# Patient Record
Sex: FEMALE | Race: WHITE | NOT HISPANIC OR LATINO | Employment: OTHER | URBAN - METROPOLITAN AREA
[De-identification: names, ages, dates, MRNs, and addresses within clinical notes are randomized per-mention and may not be internally consistent; named-entity substitution may affect disease eponyms.]

---

## 2018-06-16 ENCOUNTER — HOSPITAL ENCOUNTER (EMERGENCY)
Facility: HOSPITAL | Age: 53
Discharge: HOME | End: 2018-06-17
Attending: EMERGENCY MEDICINE
Payer: MEDICAID

## 2018-06-16 DIAGNOSIS — S82.62XA CLOSED DISPLACED FRACTURE OF LATERAL MALLEOLUS OF LEFT FIBULA, INITIAL ENCOUNTER: Primary | ICD-10-CM

## 2018-06-16 DIAGNOSIS — M25.572 LEFT ANKLE PAIN: ICD-10-CM

## 2018-06-16 PROCEDURE — 99284 EMERGENCY DEPT VISIT MOD MDM: CPT | Mod: 25

## 2018-06-16 PROCEDURE — 29515 APPLICATION SHORT LEG SPLINT: CPT | Mod: LT

## 2018-06-16 RX ORDER — HYDROCODONE BITARTRATE AND ACETAMINOPHEN 5; 325 MG/1; MG/1
1 TABLET ORAL
Status: COMPLETED | OUTPATIENT
Start: 2018-06-16 | End: 2018-06-17

## 2018-06-17 VITALS
TEMPERATURE: 98 F | WEIGHT: 165 LBS | OXYGEN SATURATION: 96 % | RESPIRATION RATE: 17 BRPM | HEART RATE: 86 BPM | SYSTOLIC BLOOD PRESSURE: 108 MMHG | DIASTOLIC BLOOD PRESSURE: 68 MMHG | BODY MASS INDEX: 33.26 KG/M2 | HEIGHT: 59 IN

## 2018-06-17 PROBLEM — S82.62XA CLOSED DISPLACED FRACTURE OF LATERAL MALLEOLUS OF LEFT FIBULA: Status: ACTIVE | Noted: 2018-06-17

## 2018-06-17 PROCEDURE — 29515 APPLICATION SHORT LEG SPLINT: CPT | Mod: LT

## 2018-06-17 PROCEDURE — 25000003 PHARM REV CODE 250: Performed by: EMERGENCY MEDICINE

## 2018-06-17 RX ORDER — ZOLPIDEM TARTRATE 10 MG/1
5 TABLET ORAL NIGHTLY PRN
COMMUNITY

## 2018-06-17 RX ORDER — TRAMADOL HYDROCHLORIDE 50 MG/1
50 TABLET ORAL EVERY 6 HOURS PRN
COMMUNITY

## 2018-06-17 RX ORDER — ONDANSETRON 4 MG/1
4 TABLET, FILM COATED ORAL EVERY 8 HOURS PRN
Qty: 30 TABLET | Refills: 0 | Status: SHIPPED | OUTPATIENT
Start: 2018-06-17

## 2018-06-17 RX ORDER — OMEPRAZOLE 40 MG/1
40 CAPSULE, DELAYED RELEASE ORAL DAILY
COMMUNITY

## 2018-06-17 RX ORDER — HYDROCODONE BITARTRATE AND ACETAMINOPHEN 5; 325 MG/1; MG/1
1 TABLET ORAL EVERY 4 HOURS PRN
Qty: 30 TABLET | Refills: 0 | Status: SHIPPED | OUTPATIENT
Start: 2018-06-17

## 2018-06-17 RX ORDER — IBUPROFEN 200 MG
1 TABLET ORAL
COMMUNITY

## 2018-06-17 RX ORDER — BUSPIRONE HYDROCHLORIDE 5 MG/1
5 TABLET ORAL 2 TIMES DAILY
COMMUNITY

## 2018-06-17 RX ORDER — LEVOTHYROXINE SODIUM 50 UG/1
50 TABLET ORAL DAILY
COMMUNITY

## 2018-06-17 RX ADMIN — HYDROCODONE BITARTRATE AND ACETAMINOPHEN 1 TABLET: 5; 325 TABLET ORAL at 12:06

## 2018-06-17 NOTE — ED NOTES
"Patient states she has "brain cancer, muscle cancer, breast cancer and joint cancer." Unable to recall the type of cancer.  "

## 2018-06-17 NOTE — ED NOTES
Bed: TL 02  Expected date:   Expected time:   Means of arrival:   Comments:     Elvin Figueroa RN  06/16/18 8301

## 2018-06-17 NOTE — ED PROVIDER NOTES
"SCRIBE #1 NOTE: I, Jimmy Fleming, am scribing for, and in the presence of, Nash Jasso Jr., MD. I have scribed the entire note.      History      Chief Complaint   Patient presents with    Fall     pt states she tripped and fell and twisted L ankle; pt c/o pain and swelling to L ankle       Review of patient's allergies indicates:   Allergen Reactions    Adhesive Itching    Cortizone-10 [hydrocortisone] Other (See Comments)        HPI   HPI    6/16/2018, 11:24 PM   History obtained from the patient       History of Present Illness: Mary Kay Addison is a 52 y.o. female patient who presents to the Emergency Department for evaluation after a fall which onset suddenly 2 hours PTA. Pt reports that she slipped, fell, and "rolled" her left ankle. Pt is currently complaining of left ankle pain. Pt denies any head trauma/ injury or LOC. Symptoms are constant and moderate in severity. Sxs are worsened with movement. Pt is currently in a wheelchair. No mitigating reported. No other associated sxs reported. Patient denies any dizziness, headache, back pain, neck pain, hip pain, knee pain, chest pain, focal weakness/ numbness, and all other sxs at this time. No further complaints or concerns at this time.       Arrival mode: Personal vehicle    PCP: Primary Doctor No       Past Medical History:  Past Medical History:   Diagnosis Date    Anemia     Anxiety     Asthma     Brain cancer     diagnosed in March    Depression     MRSA infection     Osteoarthritis     Peptic ulcer        Past Surgical History:  Past Surgical History:   Procedure Laterality Date    BREAST LUMPECTOMY Left          Family History:  Hx reviewed, not pertinent    Social History:  Social History     Social History Main Topics    Smoking status: Current Every Day Smoker     Packs/day: 0.50    Smokeless tobacco: Never Used    Alcohol use No    Drug use: No    Sexual activity: Not on file       ROS   Review of Systems   Constitutional: " Negative for chills and fever.   HENT:        - head trauma/ injury   Respiratory: Negative for shortness of breath.    Cardiovascular: Negative for chest pain.   Gastrointestinal: Negative for abdominal pain, diarrhea, nausea and vomiting.   Genitourinary: Negative for flank pain, frequency and urgency.   Musculoskeletal: Negative for back pain, neck pain and neck stiffness.        + left ankle pain  - knee pain  - hip pain   Neurological: Negative for dizziness, syncope, weakness, light-headedness, numbness and headaches.   All other systems reviewed and are negative.      Physical Exam      Initial Vitals [06/16/18 2256]   BP Pulse Resp Temp SpO2   103/65 93 20 97.8 °F (36.6 °C) 95 %      MAP       --          Physical Exam  Nursing Notes and Vital Signs Reviewed.  Constitutional: Patient is in no apparent distress. Awake and alert. Appropriate for age.   Head: Atraumatic. No facial instability or step-offs.   Eyes: PERRL. EOM normal. Conjunctivae normal.   HENT: Moist mucous membranes. No epistaxis. Patent airway.   Neck: No midline bony tenderness, deformities, or step-offs   Cardiovascular: Regular rate and rhythm. Heart sounds are normal. Intact distal pulses   Pulmonary/Chest: No respiratory distress. Breath sounds are normal. No decreased breath sounds. Chest wall is stable.   Abdominal: Soft and non-distended. Non-tender.   Back: No abrasions or ecchymosis. No midline bony tenderness to the T-spine or L-spine. No deformities or step-offs.   Musculoskeletal: Full range of motion in bilateral extremities. No obvious deformities.   Left Ankle:  No obvious deformity. Tenderness to palpation over left lateral malleolus. Limited ROM secondary to pain. Ankle dorsiflexion and ankle plantar flexion are intact.  Intact sensation to light touch. Distal capillary refill takes less than 2 seconds.  PT and DP pulses are 2+ bilaterally. NV intact. Skin intact. No abrasions or lacerations appreciated.   Skin: Normal  "color. No cyanosis. No lacerations. No abrasions   Neurological: Awake and alert. Appropriate for age. GCS 15. Normal speech. Motor strength is normal at 5/5 bilaterally. Non-focal neurological examination.        ED Course    Orthopedic Injury  Date/Time: 2018 1:48 AM  Performed by: [facility]  Authorized by: [facility]     Consent Done?:  Yes  Universal Protocol:     Consent given by:  Patient    Patient identity confirmed:   and name  Injury:     Injury location:  Ankle    Location details:  Left ankle    Injury type:  Fracture    Fracture type: lateral malleolus      Fracture type: lateral malleolus        Pre-procedure assessment:     Neurovascular status: Neurovascularly intact      Distal perfusion: normal      Neurological function: normal      Range of motion: reduced      Local anesthesia used?: No      Patient sedated?: No        Selections made in this section will also lock the Injury type section above.:     Manipulation performed?: No      Immobilization:  Crutches and splint    Splint type: stirrup & posterior splint.    Supplies used:  Aluminum splint    Complications: No      Specimens: No      Implants: No    Post-procedure assessment:     Neurovascular status: Neurovascularly intact      Distal perfusion: normal      Neurological function: normal      Range of motion: improved      Patient tolerance:  Patient tolerated the procedure well with no immediate complications      ED Vital Signs:  Vitals:    18 2256 18 0256   BP: 103/65 108/68   Pulse: 93 86   Resp: 20 17   Temp: 97.8 °F (36.6 °C) 98.3 °F (36.8 °C)   TempSrc: Oral Oral   SpO2: 95% 96%   Weight: 74.8 kg (165 lb)    Height: 4' 11" (1.499 m)            Imaging Results:  Imaging Results          X-Ray Foot Complete Left (Impression:)    Mildly displaced left lateral malleolus fracture            X-Ray Ankle Complete Left (Impression:)    Mildly displaced left lateral malleolus fracture                 "     The Emergency Provider reviewed the vital signs and test results, which are outlined above.    ED Discussion     1:35 AM: Reassessed pt at this time. Instructed patient to remain non-weight bearing. Discussed with pt all pertinent ED information and results. Discussed pt dx and plan of tx. Gave pt all f/u and return to the ED instructions. All questions and concerns were addressed at this time. Pt expresses understanding of information and instructions, and is comfortable with plan to discharge. Pt is stable for discharge.    Trauma precautions were discussed with patient and/or family/caretaker; I do not specifically detect any abdominal, thoracic, CNS, orthopedic, or other emergent or life threatening condition and that patient is safe to be discharged.  It was also discussed that despite an unrevealing examination and negative radiographic examination for serious or life threatening injury, these conditions may still exist.  As such, patient should return to ED immediately should they experience, severe or worsening pain, shortness of breath, abdominal pain, headache, vomiting, or any other concern.  It was also discussed that not infrequently, injuries may not be diagnosed during the initial ED visit (such as fractures) and that if the patient discovers a new area of concern, a new area of injury that was not evaluated in the ED, they should return for evaluation as they may have an injury that requires treatment.    Regarding FRACTURE CARE, I advised patient and guardian that patient should:  expect some discomfort and take medications as prescribed for pain management; keep extremity elevated above the level of the heart to reduce swelling; apply ice bag to outside of splint to help reduce swelling; and follow up with primary care provider or orthopedic specialist as instructed.  Instructed patient and guardian to keep splint in place to hold fracture in place and prevent further injury and to keep it  clean and dry.  Patient and guardian advised to return to the emergency department for any complications (numbness to extremity, lack of circulation, damage to splint, etc).    Regarding ANKLE PAIN, for treatment, patient instructed to: rest ankle for several days without applying weight on ankle; use an ACE bandage or ankle brace; consider using crutches or a cane to help take the weight off a sore or unsteady ankle; keep foot/ankle elevated; apply ice to area immediately and for 10-15 minutes every hour for the first day, then, apply ice every 3-4 hours for 2 more days; and try over-the-counter Tylenol or Ibuprofen for pain and swelling. Patient advised to notify primary care provider or return to ED if swelling does not decrease within 2-3 days or notice signs or symptoms of infection (redness, increased pain, fever, and warmth around ankle); or if they notice a popping sound and have immediate trouble using or moving ankle. For prevention, patient advised to obtain/maintain healthy weight; warm up before exercising; avoid sports and activities in which proper conditioning has not been achieved; ensure that shoes fit properly; use ankle support braces, work on balance, and do agility exercises.    Driving or other activities under influence of medications - Patient and/or family/caretaker was given a prescription for, or instructed to use a medicine that may impair ability to drive, operate machinery, or participate in other potentially dangerous activities.  Patient was instructed not to participate in these activities while under the influence of these medications.    ED Medication(s):  Medications   HYDROcodone-acetaminophen 5-325 mg per tablet 1 tablet (1 tablet Oral Given 6/17/18 0011)       Discharge Medication List as of 6/17/2018  1:35 AM      START taking these medications    Details   HYDROcodone-acetaminophen (NORCO) 5-325 mg per tablet Take 1 tablet by mouth every 4 (four) hours as needed for Pain.,  Starting Sun 6/17/2018, Print      ondansetron (ZOFRAN) 4 MG tablet Take 1 tablet (4 mg total) by mouth every 8 (eight) hours as needed., Starting Sun 6/17/2018, Print             Follow-up Information     [facility] - Orthoped[facility]. Schedule an appointment as soon as possible for a visit in 3 days.    Specialty:  Orthopedics  Contact information:  [address] [facility] [address] [phone]  Additional information:  ([address]) 2nd floor           [facility] - Internal Medicine. Schedule an appointment as soon as possible for a visit in 1 week.    Specialty:  Internal Medicine  Contact information:  [address] [facility] [address] [phone]  Additional information:  ([address]) 1st floor           [facility]. Schedule an appointment as soon as possible for a visit in 1 week.    Contact information:  [address] [facility] [address] [phone]             [facility]    Specialty:  Emergency Medicine  Why:  As needed, If symptoms worsen  Contact information:  [address] [facility] [address] [phone]                   Medical Decision Making    Medical Decision Making:   Clinical Tests:   Radiological Study: Reviewed and Ordered           Scribe Attestation:   Scribe #1: I performed the above scribed service and the documentation accurately describes the services I performed. I attest to the accuracy of the note.    Attending:   Physician Attestation Statement for Scribe #1: I, Nash Jasso Jr., MD, personally performed the services described in this documentation, as scribed by Jimmy Fleming, in my presence, and it is both accurate and complete.          Clinical Impression       ICD-10-CM ICD-9-CM   1. Closed displaced fracture of lateral malleolus of left fibula, initial encounter S82.62XA 824.2   2. Left ankle pain M25.572 719.47       Disposition:   Disposition: Discharged  Condition:  Stable         Nash Jasso Jr., MD  06/19/18 0124

## 2018-06-17 NOTE — ED NOTES
Bed: R 01  Expected date:   Expected time:   Means of arrival:   Comments:     Elvin Figueroa RN  06/16/18 4812

## 2018-06-17 NOTE — DISCHARGE INSTRUCTIONS
Regarding FRACTURE CARE, I advised patient and guardian that patient should:  expect some discomfort and take medications as prescribed for pain management; keep extremity elevated above the level of the heart to reduce swelling; apply ice bag to outside of splint to help reduce swelling; and follow up with primary care provider or orthopedic specialist as instructed.  Instructed patient and guardian to keep splint in place to hold fracture in place and prevent further injury and to keep it clean and dry.  Patient and guardian advised to return to the emergency department for any complications (numbness to extremity, lack of circulation, damage to splint, etc).    Regarding ANKLE PAIN, for treatment, patient instructed to: rest ankle for several days without applying weight on ankle; use an ACE bandage or ankle brace; consider using crutches or a cane to help take the weight off a sore or unsteady ankle; keep foot/ankle elevated; apply ice to area immediately and for 10-15 minutes every hour for the first day, then, apply ice every 3-4 hours for 2 more days; and try over-the-counter Tylenol or Ibuprofen for pain and swelling. Patient advised to notify primary care provider or return to ED if swelling does not decrease within 2-3 days or notice signs or symptoms of infection (redness, increased pain, fever, and warmth around ankle); or if they notice a popping sound and have immediate trouble using or moving ankle. For prevention, patient advised to obtain/maintain healthy weight; warm up before exercising; avoid sports and activities in which proper conditioning has not been achieved; ensure that shoes fit properly; use ankle support braces, work on balance, and do agility exercises.    Regarding CONTUSIONS, I advised patient to: REST the injured area or use it less than usual; apply ICE to decrease swelling and pain and help prevent tissue damage; use COMPRESSION with an elastic bandage to support the area and  decrease swelling; ELEVATE injured body part above the level of the heart to help decrease pain and swelling; AVOID using massage or massage to acute injuries as it may slow healing of the area; AVOID drinking alcohol as it may slow healing of the injury; and avoid stretching injured muscles. Advised patient to return to the emergency department or contact primary care provider if: having trouble moving injured area; notice tingling or numbness in or near the injured area; extremity below the bruise gets cold or turns pale; a new lump develops in the injured area; symptoms do not improve with treatment after 4 to 5 days; there is any questions or concerns about the condition or treatment plan.

## 2019-03-09 ENCOUNTER — HOSPITAL ENCOUNTER (EMERGENCY)
Facility: HOSPITAL | Age: 54
Discharge: HOME | End: 2019-03-09
Attending: EMERGENCY MEDICINE
Payer: MEDICAID

## 2019-03-09 VITALS
HEIGHT: 56 IN | BODY MASS INDEX: 37.5 KG/M2 | DIASTOLIC BLOOD PRESSURE: 98 MMHG | SYSTOLIC BLOOD PRESSURE: 157 MMHG | OXYGEN SATURATION: 99 % | HEART RATE: 109 BPM | TEMPERATURE: 98 F | RESPIRATION RATE: 20 BRPM | WEIGHT: 166.69 LBS

## 2019-03-09 DIAGNOSIS — R10.13 DYSPEPSIA: Primary | ICD-10-CM

## 2019-03-09 DIAGNOSIS — R10.9 ABDOMINAL PAIN: ICD-10-CM

## 2019-03-09 LAB
ALBUMIN SERPL BCP-MCNC: 4.2 G/DL
ALP SERPL-CCNC: 136 U/L
ALT SERPL W/O P-5'-P-CCNC: 13 U/L
ANION GAP SERPL CALC-SCNC: 12 MMOL/L
AST SERPL-CCNC: 19 U/L
BASOPHILS # BLD AUTO: 0.03 K/UL
BASOPHILS NFR BLD: 0.2 %
BILIRUB SERPL-MCNC: 0.3 MG/DL
BUN SERPL-MCNC: 10 MG/DL
CALCIUM SERPL-MCNC: 9.9 MG/DL
CHLORIDE SERPL-SCNC: 105 MMOL/L
CO2 SERPL-SCNC: 22 MMOL/L
CREAT SERPL-MCNC: 0.8 MG/DL
DIFFERENTIAL METHOD: ABNORMAL
EOSINOPHIL # BLD AUTO: 0 K/UL
EOSINOPHIL NFR BLD: 0.2 %
ERYTHROCYTE [DISTWIDTH] IN BLOOD BY AUTOMATED COUNT: 13.9 %
EST. GFR  (AFRICAN AMERICAN): >60 ML/MIN/1.73 M^2
EST. GFR  (NON AFRICAN AMERICAN): >60 ML/MIN/1.73 M^2
GLUCOSE SERPL-MCNC: 129 MG/DL
HCT VFR BLD AUTO: 42.5 %
HGB BLD-MCNC: 13.8 G/DL
LIPASE SERPL-CCNC: 13 U/L
LYMPHOCYTES # BLD AUTO: 1.4 K/UL
LYMPHOCYTES NFR BLD: 11.3 %
MCH RBC QN AUTO: 31.8 PG
MCHC RBC AUTO-ENTMCNC: 32.5 G/DL
MCV RBC AUTO: 98 FL
MONOCYTES # BLD AUTO: 0.7 K/UL
MONOCYTES NFR BLD: 5.5 %
NEUTROPHILS # BLD AUTO: 10.4 K/UL
NEUTROPHILS NFR BLD: 82.8 %
PLATELET # BLD AUTO: 399 K/UL
PMV BLD AUTO: 10.4 FL
POTASSIUM SERPL-SCNC: 3.8 MMOL/L
PROT SERPL-MCNC: 8.1 G/DL
RBC # BLD AUTO: 4.34 M/UL
SODIUM SERPL-SCNC: 139 MMOL/L
TROPONIN I SERPL DL<=0.01 NG/ML-MCNC: <0.006 NG/ML
WBC # BLD AUTO: 12.52 K/UL

## 2019-03-09 PROCEDURE — 25000003 PHARM REV CODE 250: Performed by: EMERGENCY MEDICINE

## 2019-03-09 PROCEDURE — 96361 HYDRATE IV INFUSION ADD-ON: CPT

## 2019-03-09 PROCEDURE — 63600175 PHARM REV CODE 636 W HCPCS: Performed by: EMERGENCY MEDICINE

## 2019-03-09 PROCEDURE — 85025 COMPLETE CBC W/AUTO DIFF WBC: CPT

## 2019-03-09 PROCEDURE — 93010 EKG 12-LEAD: ICD-10-PCS | Mod: ,,, | Performed by: INTERNAL MEDICINE

## 2019-03-09 PROCEDURE — S0028 INJECTION, FAMOTIDINE, 20 MG: HCPCS | Performed by: EMERGENCY MEDICINE

## 2019-03-09 PROCEDURE — 84484 ASSAY OF TROPONIN QUANT: CPT

## 2019-03-09 PROCEDURE — 93010 ELECTROCARDIOGRAM REPORT: CPT | Mod: ,,, | Performed by: INTERNAL MEDICINE

## 2019-03-09 PROCEDURE — 80053 COMPREHEN METABOLIC PANEL: CPT

## 2019-03-09 PROCEDURE — 99285 EMERGENCY DEPT VISIT HI MDM: CPT | Mod: 25

## 2019-03-09 PROCEDURE — 96374 THER/PROPH/DIAG INJ IV PUSH: CPT

## 2019-03-09 PROCEDURE — 96375 TX/PRO/DX INJ NEW DRUG ADDON: CPT

## 2019-03-09 PROCEDURE — 83690 ASSAY OF LIPASE: CPT

## 2019-03-09 PROCEDURE — 86703 HIV-1/HIV-2 1 RESULT ANTBDY: CPT

## 2019-03-09 PROCEDURE — 93005 ELECTROCARDIOGRAM TRACING: CPT

## 2019-03-09 RX ORDER — FAMOTIDINE 10 MG/ML
20 INJECTION INTRAVENOUS
Status: COMPLETED | OUTPATIENT
Start: 2019-03-09 | End: 2019-03-09

## 2019-03-09 RX ORDER — ONDANSETRON 2 MG/ML
4 INJECTION INTRAMUSCULAR; INTRAVENOUS
Status: COMPLETED | OUTPATIENT
Start: 2019-03-09 | End: 2019-03-09

## 2019-03-09 RX ADMIN — SODIUM CHLORIDE 500 ML: 0.9 INJECTION, SOLUTION INTRAVENOUS at 02:03

## 2019-03-09 RX ADMIN — ONDANSETRON 4 MG: 2 INJECTION INTRAMUSCULAR; INTRAVENOUS at 02:03

## 2019-03-09 RX ADMIN — FAMOTIDINE 20 MG: 10 INJECTION INTRAVENOUS at 02:03

## 2019-03-09 RX ADMIN — LIDOCAINE HYDROCHLORIDE 50 ML: 20 SOLUTION ORAL; TOPICAL at 02:03

## 2019-03-09 NOTE — ED NOTES
While in room next door. Pt called and because she was bleeding. Upon entering curtain area, pt had iv catheter in her hand. Pt denies pulling iv out her self and states she doesn't know how it came out. Dressing applied

## 2019-03-09 NOTE — ED PROVIDER NOTES
SCRIBE #1 NOTE: I, Cindy Bustillos, am scribing for, and in the presence of, Jewels Camarillo MD. I have scribed the entire note.      History      Chief Complaint   Patient presents with    Abdominal Pain     LUQ abd pain x 2 weeks with epigastric       Review of patient's allergies indicates:   Allergen Reactions    Adhesive Itching    Cortizone-10 [hydrocortisone] Other (See Comments)        HPI   HPI    3/9/2019, 1:50 PM   History obtained from the patient      History of Present Illness: Mary Kay Addison is a 53 y.o. female patient with a PMHx of peptic ulcers and anemia presents to the Emergency Department for abd pain which onset gradually 2 weeks ago. Symptoms are constant and moderate in severity. No mitigating or exacerbating factors reported. Associated sxs include N/D. Patient denies any fever, chills, diaphoresis, constipation, hematochezia, dysuria, hematuria, urinary frequency, emesis, and all other sxs at this time. No prior tx. Pt reports she is currently taking omeprazole for GERD.  No further complaints or concerns at this time.         Arrival mode:  EMS      PCP: Primary Doctor No       Past Medical History:  Past Medical History:   Diagnosis Date    Anemia     Anxiety     Asthma     Brain cancer     diagnosed in March    Depression     MRSA infection     Osteoarthritis     Peptic ulcer        Past Surgical History:  Past Surgical History:   Procedure Laterality Date    BREAST LUMPECTOMY Left       Family History:  Family history reviewed not relevant      Social History:  Social History     Tobacco Use    Smoking status: Current Every Day Smoker     Packs/day: 0.50    Smokeless tobacco: Never Used   Substance and Sexual Activity    Alcohol use: No    Drug use: No    Sexual activity: Unknown       ROS   Review of Systems   Constitutional: Negative for chills, diaphoresis and fever.   HENT: Negative for sore throat.    Respiratory: Negative for shortness of breath.   "  Cardiovascular: Negative for chest pain.   Gastrointestinal: Positive for abdominal pain (epigastric), diarrhea and nausea. Negative for blood in stool, constipation and vomiting.   Genitourinary: Negative for dysuria, frequency, hematuria and urgency.   Musculoskeletal: Negative for back pain.   Skin: Negative for rash.   Neurological: Negative for weakness.   Hematological: Does not bruise/bleed easily.   All other systems reviewed and are negative.      Physical Exam      Initial Vitals [03/09/19 1347]   BP Pulse Resp Temp SpO2   (!) 157/98 109 20 98.1 °F (36.7 °C) 99 %      MAP       --          Physical Exam  Nursing Notes and Vital Signs Reviewed.  Constitutional: Patient is in no acute distress. Well-developed and well-nourished.  Head: Atraumatic. Normocephalic.  Eyes: PERRL. EOM intact. Conjunctivae are not pale. No scleral icterus.  ENT: Mucous membranes are moist. Oropharynx is clear and symmetric.    Neck: Supple. Full ROM. No lymphadenopathy.  Cardiovascular: Regular rate. Regular rhythm. No murmurs, rubs, or gallops. Distal pulses are 2+ and symmetric.  Pulmonary/Chest: No respiratory distress. Clear to auscultation bilaterally. No wheezing or rales.  Abdominal: Soft and non-distended.  Epigastric tenderness.  No rebound, guarding, or rigidity. Good bowel sounds.  Genitourinary: No CVA tenderness  Musculoskeletal: Moves all extremities. No obvious deformities. No edema. No calf tenderness.  Skin: Warm and dry.  Neurological:  Alert, awake, and appropriate.  Normal speech.  No acute focal neurological deficits are appreciated.  Psychiatric: Normal affect. Good eye contact. Appropriate in content.    ED Course    Procedures  ED Vital Signs:  Vitals:    03/09/19 1347 03/09/19 1413   BP: (!) 157/98    Pulse: 109    Resp: 20    Temp: 98.1 °F (36.7 °C)    TempSrc: Oral    SpO2: 99%    Weight:  75.6 kg (166 lb 11.2 oz)   Height: 4' 8" (1.422 m)        All Lab Results:  Results for orders placed or performed " during the hospital encounter of 03/09/19   CBC W/ AUTO DIFFERENTIAL   Result Value Ref Range    WBC 12.52 3.90 - 12.70 K/uL    RBC 4.34 4.00 - 5.40 M/uL    Hemoglobin 13.8 12.0 - 16.0 g/dL    Hematocrit 42.5 37.0 - 48.5 %    MCV 98 82 - 98 fL    MCH 31.8 (H) 27.0 - 31.0 pg    MCHC 32.5 32.0 - 36.0 g/dL    RDW 13.9 11.5 - 14.5 %    Platelets 399 (H) 150 - 350 K/uL    MPV 10.4 9.2 - 12.9 fL    Gran # (ANC) 10.4 (H) 1.8 - 7.7 K/uL    Lymph # 1.4 1.0 - 4.8 K/uL    Mono # 0.7 0.3 - 1.0 K/uL    Eos # 0.0 0.0 - 0.5 K/uL    Baso # 0.03 0.00 - 0.20 K/uL    Gran% 82.8 (H) 38.0 - 73.0 %    Lymph% 11.3 (L) 18.0 - 48.0 %    Mono% 5.5 4.0 - 15.0 %    Eosinophil% 0.2 0.0 - 8.0 %    Basophil% 0.2 0.0 - 1.9 %    Differential Method Automated    Comp. Metabolic Panel   Result Value Ref Range    Sodium 139 136 - 145 mmol/L    Potassium 3.8 3.5 - 5.1 mmol/L    Chloride 105 95 - 110 mmol/L    CO2 22 (L) 23 - 29 mmol/L    Glucose 129 (H) 70 - 110 mg/dL    BUN, Bld 10 6 - 20 mg/dL    Creatinine 0.8 0.5 - 1.4 mg/dL    Calcium 9.9 8.7 - 10.5 mg/dL    Total Protein 8.1 6.0 - 8.4 g/dL    Albumin 4.2 3.5 - 5.2 g/dL    Total Bilirubin 0.3 0.1 - 1.0 mg/dL    Alkaline Phosphatase 136 (H) 55 - 135 U/L    AST 19 10 - 40 U/L    ALT 13 10 - 44 U/L    Anion Gap 12 8 - 16 mmol/L    eGFR if African American >60 >60 mL/min/1.73 m^2    eGFR if non African American >60 >60 mL/min/1.73 m^2   Lipase   Result Value Ref Range    Lipase 13 4 - 60 U/L   Troponin I   Result Value Ref Range    Troponin I <0.006 0.000 - 0.026 ng/mL         Imaging Results:  Imaging Results          X-Ray Abdomen Flat And Erect (Final result)  Result time 03/09/19 14:46:49    Final result by Abdoul Mobley III, MD (03/09/19 14:46:49)                 Impression:      No evidence of acute abdominal abnormality.      Electronically signed by: Abdoul Mobley MD  Date:    03/09/2019  Time:    14:46             Narrative:    EXAMINATION:  XR ABDOMEN FLAT AND ERECT    CLINICAL  HISTORY:  Abdominal Pain;    COMPARISON:  None    FINDINGS:  Nonspecific gas pattern without mass or obstruction.  No free air.  No acute abnormality suggested.                                    The EKG was ordered, reviewed, and independently interpreted by the ED provider.  Interpretation time: 1358  Rate: 94 BPM  Rhythm: normal sinus rhythm  Interpretation: Septal infarct, age undetermined. No STEMI.        The Emergency Provider reviewed the vital signs and test results, which are outlined above.    ED Discussion     3:24 PM: Reassessed pt at this time. Patient is awake, alert, and in NAD. Pt states her condition has improved at this time. Discussed with pt all pertinent ED information and results. Discussed pt dx and plan of tx. Gave pt all f/u and return to the ED instructions. All questions and concerns were addressed at this time. Pt expresses understanding of information and instructions, and is comfortable with plan to discharge. Pt is stable for discharge.    I discussed with patient and/or family/caretaker that evaluation in the ED does not suggest any emergent or life threatening medical conditions requiring immediate intervention beyond what was provided in the ED, and I believe patient is safe for discharge.  Regardless, an unremarkable evaluation in the ED does not preclude the development or presence of a serious of life threatening condition. As such, patient was instructed to return immediately for any worsening or change in current symptoms.    ED Medication(s):  Medications   famotidine (PF) injection 20 mg (20 mg Intravenous Given 3/9/19 1432)   GI cocktail (mylanta 30 mL, lidocaine 2 % viscous 10 mL, dicyclomine 10 mL) 50 mL (50 mLs Oral Given 3/9/19 1432)   ondansetron injection 4 mg (4 mg Intravenous Given 3/9/19 1432)   sodium chloride 0.9% bolus 500 mL (0 mLs Intravenous Stopped 3/9/19 1511)       Follow-up Information     [facility]. Schedule an appointment as soon as  possible for a visit in 2 days.    Why:  Return to the Emergency Room, If symptoms worsen  Contact information:  [address] [facility] [address]  [phone]                  Discharge Medication List as of 3/9/2019  3:23 PM            Medical Decision Making    Medical Decision Making:   Clinical Tests:   Lab Tests: Ordered and Reviewed  Radiological Study: Ordered and Reviewed  Medical Tests: Ordered and Reviewed           Scribe Attestation:   Scribe #1: I performed the above scribed service and the documentation accurately describes the services I performed. I attest to the accuracy of the note.    Attending:   Physician Attestation Statement for Scribe #1: I, Jewels Camarillo MD, personally performed the services described in this documentation, as scribed by Cindy Bustillos, in my presence, and it is both accurate and complete.          Clinical Impression       ICD-10-CM ICD-9-CM   1. Dyspepsia R10.13 536.8   2. Abdominal pain R10.9 789.00       Disposition:   Disposition: Discharged  Condition: Stable         Jewels Camarillo MD  03/09/19 [id number]

## 2019-03-11 LAB — HIV 1+2 AB+HIV1 P24 AG SERPL QL IA: NEGATIVE
